# Patient Record
Sex: MALE | Race: WHITE | NOT HISPANIC OR LATINO | URBAN - METROPOLITAN AREA
[De-identification: names, ages, dates, MRNs, and addresses within clinical notes are randomized per-mention and may not be internally consistent; named-entity substitution may affect disease eponyms.]

---

## 2024-11-27 ENCOUNTER — APPOINTMENT (OUTPATIENT)
Dept: PRIMARY CARE | Facility: CLINIC | Age: 64
End: 2024-11-27
Payer: COMMERCIAL

## 2024-11-27 VITALS
BODY MASS INDEX: 21.1 KG/M2 | HEIGHT: 72 IN | SYSTOLIC BLOOD PRESSURE: 120 MMHG | OXYGEN SATURATION: 97 % | DIASTOLIC BLOOD PRESSURE: 60 MMHG | WEIGHT: 155.8 LBS | HEART RATE: 66 BPM

## 2024-11-27 DIAGNOSIS — Z12.11 ENCOUNTER FOR SCREENING FOR MALIGNANT NEOPLASM OF COLON: ICD-10-CM

## 2024-11-27 DIAGNOSIS — Z12.5 PROSTATE CANCER SCREENING: ICD-10-CM

## 2024-11-27 DIAGNOSIS — M72.0 DUPUYTREN CONTRACTURE: ICD-10-CM

## 2024-11-27 DIAGNOSIS — Z00.00 ANNUAL PHYSICAL EXAM: Primary | ICD-10-CM

## 2024-11-27 DIAGNOSIS — Z13.220 SCREENING, LIPID: ICD-10-CM

## 2024-11-27 PROCEDURE — G0402 INITIAL PREVENTIVE EXAM: HCPCS | Performed by: STUDENT IN AN ORGANIZED HEALTH CARE EDUCATION/TRAINING PROGRAM

## 2024-11-27 PROCEDURE — 1170F FXNL STATUS ASSESSED: CPT | Performed by: STUDENT IN AN ORGANIZED HEALTH CARE EDUCATION/TRAINING PROGRAM

## 2024-11-27 PROCEDURE — 3008F BODY MASS INDEX DOCD: CPT | Performed by: STUDENT IN AN ORGANIZED HEALTH CARE EDUCATION/TRAINING PROGRAM

## 2024-11-27 PROCEDURE — 1159F MED LIST DOCD IN RCRD: CPT | Performed by: STUDENT IN AN ORGANIZED HEALTH CARE EDUCATION/TRAINING PROGRAM

## 2024-11-27 PROCEDURE — 1160F RVW MEDS BY RX/DR IN RCRD: CPT | Performed by: STUDENT IN AN ORGANIZED HEALTH CARE EDUCATION/TRAINING PROGRAM

## 2024-11-27 ASSESSMENT — ACTIVITIES OF DAILY LIVING (ADL)
DOING_HOUSEWORK: INDEPENDENT
MANAGING_FINANCES: INDEPENDENT
GROCERY_SHOPPING: INDEPENDENT
DRESSING: INDEPENDENT
BATHING: INDEPENDENT
TAKING_MEDICATION: INDEPENDENT

## 2024-11-27 ASSESSMENT — VISUAL ACUITY
OS_CC: 20/100
OD_CC: 20/30

## 2024-11-27 ASSESSMENT — PATIENT HEALTH QUESTIONNAIRE - PHQ9
2. FEELING DOWN, DEPRESSED OR HOPELESS: NOT AT ALL
SUM OF ALL RESPONSES TO PHQ9 QUESTIONS 1 AND 2: 0
1. LITTLE INTEREST OR PLEASURE IN DOING THINGS: NOT AT ALL

## 2024-11-27 NOTE — PROGRESS NOTES
History Of Present Illness  Jeffrey Garland is a 64 y.o. male presents for establishment of care.   Calcium CT score 0 2/2020    Lung nodules noted on calcium CT on 2/2020 2/2022  Likely a 7 mm fissural lymph node along the right major fissure.  This was present previously but appears slightly more conspicuous on  today's exam which could be due to slice and measurement variations.  2. Previously described 5.5 mm left upper lobe nodule has remained  stable.    PET 1/23  1. This study demonstrates non or faint FDG avid 7 mm Fissural lymph  node along the right major fissure and 5 mm pulmonary nodule in the  left upper lobe, stable compared to prior study. No new concerning  pulmonary nodule identified. Follow-up with dedicated CT for  stability is recommended.  2. No other concerning FDG avid disease identified.    Avid runner 6-7 miles a week, diet,    Dupuytren contractures bilateral hands    Travel, farm (maple syrup, blueberries, honey bees)    Past Medical History  He has no past medical history on file.    Surgical History  He has a past surgical history that includes Hernia repair (12/09/2013).     Social History  He reports that he has never smoked. He has never used smokeless tobacco. He reports that he does not currently use alcohol after a past usage of about 1.0 - 2.0 standard drink of alcohol per week. He reports that he does not use drugs.    Family History  Family History   Problem Relation Name Age of Onset    Hypertension Mother      No Known Problems Father          Allergies  Patient has no known allergies.    Physical Exam  Vitals reviewed.   Constitutional:       General: He is not in acute distress.     Appearance: He is not ill-appearing.   HENT:      Right Ear: Tympanic membrane and ear canal normal.      Left Ear: Tympanic membrane and ear canal normal.      Mouth/Throat:      Mouth: Mucous membranes are moist.      Pharynx: Oropharynx is clear. No oropharyngeal exudate or posterior  oropharyngeal erythema.   Eyes:      Extraocular Movements: Extraocular movements intact.      Conjunctiva/sclera: Conjunctivae normal.      Pupils: Pupils are equal, round, and reactive to light.   Neck:      Vascular: No carotid bruit.   Cardiovascular:      Rate and Rhythm: Normal rate and regular rhythm.      Heart sounds: No murmur heard.     No gallop.   Pulmonary:      Effort: Pulmonary effort is normal.      Breath sounds: Normal breath sounds. No wheezing, rhonchi or rales.   Abdominal:      General: Abdomen is flat. Bowel sounds are normal.      Palpations: Abdomen is soft.      Tenderness: There is no abdominal tenderness.   Musculoskeletal:      Cervical back: Neck supple.      Left lower leg: No edema.   Skin:     General: Skin is warm and dry.      Findings: No rash.   Neurological:      General: No focal deficit present.      Mental Status: He is alert and oriented to person, place, and time.      Gait: Gait normal.   Psychiatric:         Mood and Affect: Mood normal.         Behavior: Behavior normal.          Last Recorded Vitals  /60   Pulse 66   Wt 70.7 kg (155 lb 12.8 oz)   SpO2 97%     Relevant Results  No current outpatient medications on file.         Assessment/Plan   Diagnoses and all orders for this visit:  Encounter for screening for malignant neoplasm of colon  -     Cologuard® colon cancer screening; Future  Screening, lipid  -     Lipid Panel; Future  Prostate cancer screening  -     Prostate Specific Antigen, Screen; Future  Dupuytren contracture  Annual physical exam  -     Comprehensive Metabolic Panel; Future  -     TSH with reflex to Free T4 if abnormal; Future    Dupuytren Contracture   Bilateral   Pt not ready for intervention at this time   Pt will fu with ortho hand when ready   Observation for now     Colon cancer screening: cologaurd ordered  Lipid screening ordered   Prostate screening ordered    Health Maintenance   Topic Date Due    Welcome to Medicare Visit   Never done    Colorectal Cancer Screening  Never done    MMR Vaccines (1 of 1 - Standard series) Never done    Hepatitis C Screening  Never done    DTaP/Tdap/Td Vaccines (1 - Tdap) Never done    Lipid Panel  11/22/2027    RSV High Risk: (Elderly (60+) or Pregnant Population)  Completed    Influenza Vaccine  Completed    Pneumococcal Vaccine: 65+ Years  Completed    Zoster Vaccines  Completed    COVID-19 Vaccine  Completed    HIB Vaccines  Aged Out    Hepatitis B Vaccines  Aged Out    IPV Vaccines  Aged Out    Hepatitis A Vaccines  Aged Out    Meningococcal Vaccine  Aged Out    Rotavirus Vaccines  Aged Out    HPV Vaccines  Aged Out         Health Maintenance   Topic Date Due    Welcome to Medicare Visit  Never done    Colorectal Cancer Screening  Never done    MMR Vaccines (1 of 1 - Standard series) Never done    Hepatitis C Screening  Never done    DTaP/Tdap/Td Vaccines (1 - Tdap) Never done    Lipid Panel  11/22/2027    RSV High Risk: (Elderly (60+) or Pregnant Population)  Completed    Influenza Vaccine  Completed    Pneumococcal Vaccine: 65+ Years  Completed    Zoster Vaccines  Completed    COVID-19 Vaccine  Completed    HIB Vaccines  Aged Out    Hepatitis B Vaccines  Aged Out    IPV Vaccines  Aged Out    Hepatitis A Vaccines  Aged Out    Meningococcal Vaccine  Aged Out    Rotavirus Vaccines  Aged Out    HPV Vaccines  Aged Out            Danette Ambrose,

## 2024-12-06 ENCOUNTER — LAB (OUTPATIENT)
Dept: LAB | Facility: LAB | Age: 64
End: 2024-12-06
Payer: MEDICARE

## 2024-12-06 DIAGNOSIS — Z00.00 ANNUAL PHYSICAL EXAM: ICD-10-CM

## 2024-12-06 DIAGNOSIS — Z12.5 PROSTATE CANCER SCREENING: ICD-10-CM

## 2024-12-06 DIAGNOSIS — Z13.220 SCREENING, LIPID: ICD-10-CM

## 2024-12-06 LAB
ALBUMIN SERPL BCP-MCNC: 4.2 G/DL (ref 3.4–5)
ALP SERPL-CCNC: 60 U/L (ref 33–136)
ALT SERPL W P-5'-P-CCNC: 15 U/L (ref 10–52)
ANION GAP SERPL CALC-SCNC: 12 MMOL/L (ref 10–20)
AST SERPL W P-5'-P-CCNC: 20 U/L (ref 9–39)
BILIRUB SERPL-MCNC: 0.8 MG/DL (ref 0–1.2)
BUN SERPL-MCNC: 11 MG/DL (ref 6–23)
CALCIUM SERPL-MCNC: 8.8 MG/DL (ref 8.6–10.3)
CHLORIDE SERPL-SCNC: 103 MMOL/L (ref 98–107)
CHOLEST SERPL-MCNC: 157 MG/DL (ref 0–199)
CHOLESTEROL/HDL RATIO: 2.5
CO2 SERPL-SCNC: 30 MMOL/L (ref 21–32)
CREAT SERPL-MCNC: 0.86 MG/DL (ref 0.5–1.3)
EGFRCR SERPLBLD CKD-EPI 2021: >90 ML/MIN/1.73M*2
GLUCOSE SERPL-MCNC: 89 MG/DL (ref 74–99)
HDLC SERPL-MCNC: 62.9 MG/DL
LDLC SERPL CALC-MCNC: 81 MG/DL
NON HDL CHOLESTEROL: 94 MG/DL (ref 0–149)
POTASSIUM SERPL-SCNC: 3.9 MMOL/L (ref 3.5–5.3)
PROT SERPL-MCNC: 6.5 G/DL (ref 6.4–8.2)
PSA SERPL-MCNC: 1.17 NG/ML
SODIUM SERPL-SCNC: 141 MMOL/L (ref 136–145)
TRIGL SERPL-MCNC: 67 MG/DL (ref 0–149)
TSH SERPL-ACNC: 2.97 MIU/L (ref 0.44–3.98)
VLDL: 13 MG/DL (ref 0–40)

## 2024-12-06 PROCEDURE — 80061 LIPID PANEL: CPT

## 2024-12-06 PROCEDURE — G0103 PSA SCREENING: HCPCS

## 2024-12-06 PROCEDURE — 80053 COMPREHEN METABOLIC PANEL: CPT

## 2024-12-06 PROCEDURE — 36415 COLL VENOUS BLD VENIPUNCTURE: CPT

## 2024-12-06 PROCEDURE — 84443 ASSAY THYROID STIM HORMONE: CPT

## 2024-12-19 LAB — NONINV COLON CA DNA+OCC BLD SCRN STL QL: NEGATIVE

## 2025-12-01 ENCOUNTER — APPOINTMENT (OUTPATIENT)
Dept: PRIMARY CARE | Facility: CLINIC | Age: 65
End: 2025-12-01
Payer: MEDICARE